# Patient Record
Sex: FEMALE | Race: BLACK OR AFRICAN AMERICAN
[De-identification: names, ages, dates, MRNs, and addresses within clinical notes are randomized per-mention and may not be internally consistent; named-entity substitution may affect disease eponyms.]

---

## 2017-02-24 ENCOUNTER — HOSPITAL ENCOUNTER (EMERGENCY)
Dept: HOSPITAL 74 - JER | Age: 27
LOS: 1 days | Discharge: HOME | End: 2017-02-25
Payer: COMMERCIAL

## 2017-02-24 VITALS — DIASTOLIC BLOOD PRESSURE: 70 MMHG | TEMPERATURE: 98.2 F | HEART RATE: 119 BPM | SYSTOLIC BLOOD PRESSURE: 138 MMHG

## 2017-02-24 VITALS — BODY MASS INDEX: 42.2 KG/M2
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
## 2017-02-24 DIAGNOSIS — S93.492A: Primary | ICD-10-CM
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
## 2017-02-25 NOTE — PDOC
History of Present Illness





- General


History Source: Patient


Exam Limitations: No Limitations





- History of Present Illness


Initial Comments: 


17 00:39





The patient is a 26 year old female (; currently approximately 19 weeks 

pregnant), with no significant past medical history, who presents to the 

emergency department with left ankle pain s/p a mechanical trip and fall down 

the stairs earlier this evening. The patient states that she missed a step and 

fell, landing on her buttocks. She states that she did not hit her head, denies 

head trauma or LOC. The patient denies neck pain, back pain or any numbness/

tingling. The patient denies any other injury. 





Allergies: Penicillins.


Past Surgical History: None reported. 


Social History: Non smoker. Denies alcohol or drug use. 


OBGYN: Dr. Dao 





<Marlene Villa - Last Filed: 17 02:10>





- General


History Source: Patient


Exam Limitations: No Limitations





<Dionisio Mayorga - Last Filed: 17 02:38>





- General


Chief Complaint: Injury


Stated Complaint: FALL/INJURY/25 WKS PREGNANT


Time Seen by Provider: 17 23:52





Past History





<Marlene Villa - Last Filed: 17 02:10>





- Past Medical History


Asthma: No


Cancer: No


Cardiac Disorders: No


Diabetes: No


HTN: No


Seizures: No


Thyroid Disease: No





- Psycho/Social/Smoking Cessation Hx


Anxiety: No


Suicidal Ideation: No


Smoking Status: No


Smoking History: Never smoked


Have you smoked in the past 12 months: No


Number of Cigarettes Smoked Daily: 0


Information on smoking cessation initiated: No


Hx Alcohol Use: No


Drug/Substance Use Hx: No


Substance Use Type: None


Hx Substance Use Treatment: No





<Dionisio Mayorga - Last Filed: 17 02:38>





- Past Medical History


Allergies/Adverse Reactions: 


 Allergies











Allergy/AdvReac Type Severity Reaction Status Date / Time


 


Penicillins Allergy  Hives Verified 17 23:38











Home Medications: 


Ambulatory Orders





Pnv with Ca,No.71/Iron/FA [Prenatal Vitamin Tablet] 1 tab PO DAILY 13 











**Review of Systems





- Review of Systems


Able to Perform ROS?: Yes


Comments:: 


17 00:38





GENERAL/CONSTITUTIONAL: No fever or chills. No weakness.


HEAD, EYES, EARS, NOSE AND THROAT: No change in vision. No ear pain or 

discharge. No sore throat.


CARDIOVASCULAR: No chest pain or shortness of breath.


RESPIRATORY: No cough, wheezing, or hemoptysis.


GASTROINTESTINAL: No nausea, vomiting, diarrhea or constipation.


GENITOURINARY: No dysuria, frequency, or change in urination.


MUSCULOSKELETAL: +Left ankle pain. No muscle swelling or pain. No neck or back 

pain.


SKIN: No rash.


NEUROLOGIC: No headache, vertigo, loss of consciousness, or change in strength/

sensation.


ENDOCRINE: No increased thirst. No abnormal weight change.


HEMATOLOGIC/LYMPHATIC: No anemia, easy bleeding, or history of blood clots.


ALLERGIC/IMMUNOLOGIC: No hives or skin allergy.





<Marlene Villa - Last Filed: 17 02:10>





*Physical Exam





- Vital Signs


 Last Vital Signs











Temp Pulse Resp BP Pulse Ox


 


 98.2 F   119 H  20   138/70   100 


 


 17 23:34  17 23:34  17 23:34  17 23:34  17 23:34














- Physical Exam


Comments: 


17 00:38





GENERAL: Awake, alert, and fully oriented, in no acute distress. 


HEAD: No signs of trauma. 


EYES: PERRLA, EOMI, sclera anicteric, conjunctiva clear. 


ENT: Auricles normal inspection, hearing grossly normal, nares patent, 

oropharynx clear without exudates. Moist mucosa. 


NECK: Normal ROM, supple, no lymphadenopathy, JVD, or masses. 


LUNGS: Breath sounds equal, clear to auscultation bilaterally. No wheezes, and 

no crackles. 


HEART: Regular rate and rhythm, normal S1 and S2, no murmurs, rubs or gallops. 


ABDOMEN: Soft, nontender, normoactive bowel sounds. No guarding, no rebound. No 

masses. 


EXTREMITIES: Swollen left ankle, tenderness to the lateral and medial malleolus

, worse at the medial malleolus. No clubbing or cyanosis. No cords or erythema. 


NEUROLOGICAL: Cranial nerves II through XII intact. Normal speech, gait 

deferred. 


SKIN: Warm, dry, normal turgor, no rashes or lesions noted. 





<Marlene Villa - Last Filed: 17 02:10>





- Vital Signs


 Last Vital Signs











Temp Pulse Resp BP Pulse Ox


 


 98.2 F   119 H  20   138/70   100 


 


 17 23:34  17 23:34  17 23:34  17 23:34  17 23:34














<Dionisio Mayorga - Last Filed: 17 02:38>





Procedures





- Consent


Consent obtained: Verbal, From Patient





- Splinting


Splint Location: Left: Ankle


Pre-Proc Neuro Vasc Exam: normal





<Dionisio Mayorga - Last Filed: 17 02:38>





ED Treatment Course





- Medications


Given in the ED: 


ED Medications














Discontinued Medications














Generic Name Dose Route Start Last Admin





  Trade Name Freq  PRN Reason Stop Dose Admin


 


Acetaminophen  975 mg 17 00:17 17 00:19





  Tylenol -  PO 17 00:18  975 mg





  ONCE ONE   Administration














<Marlene Villa - Last Filed: 17 02:10>





- RADIOLOGY


Radiology Studies Ordered: 














 Category Date Time Status


 


 ANKLE-LEFT [RAD] Stat Radiology  17 00:17 Ordered














- Medications


Given in the ED: 


ED Medications














Discontinued Medications














Generic Name Dose Route Start Last Admin





  Trade Name Freq  PRN Reason Stop Dose Admin


 


Acetaminophen  975 mg 17 00:17 17 00:19





  Tylenol -  PO 17 00:18  975 mg





  ONCE ONE   Administration














<Dioniiso Mayorga - Last Filed: 17 02:38>





Medical Decision Making





- Medical Decision Making


17 00:29





A portion of this note was documented by scribe services under my direction. I 

have reviewed the details of the note, within reason, and agree with the 

documentation with the following case summary and management plan written by 

me. 





Patient treated in the ED.





Nursing notes are reviewed and incorporated into the medical decision-making.


Vital signs reviewed.





Peripheral IV access obtained by the nurse, laboratory studies are drawn and 

sent, reviewed and interpreted by myself. 





 Vital Signs











Temp Pulse Resp BP Pulse Ox


 


 98.2 F   119 H  20   138/70   100 


 


 17 23:34  17 23:34  17 23:34  17 23:34  02/24/17 23:34








26-year-old female with no past medical history  approximately 19 weeks 

pregnant, followed by Dr. Dao presents with mechanical fall. Patient twisted 

her ankle and landed on her buttocks. Denies abdominal pain or vaginal 

bleeding. Denies head trauma. Patient reports left ankle swelling and pain 

acutely worse at the medial malleolus.





Patient fails [place] ankle rule. I discussed the risks and benefits of 

radiograph while pregnant with the patient. Given the minimal exposure of x-ray 

and abdominal coverage with lead, the patient agrees for ankle x-ray. We'll 

perform an ankle x-ray. 





17 02:13





Xray reviewed by me, pending official radiology read. No acute fractures.


however, I suspect that the patient has a high degree sprain.


Patient placed in a posterior U splint and made non weight bearing.


Elevate the leg.


Ice PRN.


Follow up with orthopedics.


Pt will go home with her .


Tylenol only for pain.


Crutches given.





I had called the L&D. Given less than 20 weeks, patient can be discharged from 

the ED and does not necessitate FHR monitoring.





Pt has NO abdominal pain or vaginal bleed. She is cleared to be discharged.


patient verbalizes understanding and agrees with plan.





I discussed the physical exam findings, ancillary test results and final 

diagnoses with the patient.  I answered all of the patient's questions.  The 

patient was satisfied with the care received and felt comfortable with the 

discharge plan and treatment plan.  The patient will call their primary care 

physician within 24 hours to arrange follow-up and will return to the Emergency 

Department with any new, persistant or worsening symptoms. 








17 02:37


After discussing with [facility], they had reviewed the chart and confirmed 

that the patient is less than 20 weeks pregnant. I had confirmed with the 

patient and she reports that she is less than 20 weeks pregnant.





<Dionisio Mayorga - Last Filed: 17 02:38>





*DC/Admit/Observation/Transfer





- Attestations


Scribe Attestion: 


17 00:38





Documentation prepared by Marlene Villa, acting as medical scribe for Dionisio Mayorga MD. 





<Marlene Villa - Last Filed: 17 02:10>





- Discharge Dispostion


Admit: No





<Dionisio Mayorga - Last Filed: 17 02:38>


Diagnosis at time of Disposition: 


High ankle sprain


Qualifiers:


 Encounter type: initial encounter Laterality: left Qualified Code(s): S93.432A 

- Sprain of tibiofibular ligament of left ankle, initial encounter





- Discharge Dispostion


Disposition: HOME


Condition at time of disposition: Improved





- Referrals


Referrals: 


Paul Bustos MD [Staff Physician] - 





- Patient Instructions


Printed Discharge Instructions:  DI for Ankle Sprain


Additional Instructions: 


Elevate the leg as much as you can.


650 mg tylenol every 4 hours as needed for pain.


Do not bear weight on the ankle.


Use crutches.


Follow up with your ob/gyn.


Call and schedule an appointment with an orthopedist, Dr. Bustos.


Ice as needed.





- Post Discharge Activity


Work/School Note:  Back to Work

## 2017-07-25 ENCOUNTER — HOSPITAL ENCOUNTER (INPATIENT)
Dept: HOSPITAL 74 - JLDR | Age: 27
LOS: 3 days | Discharge: HOME | DRG: 540 | End: 2017-07-28
Attending: OBSTETRICS & GYNECOLOGY | Admitting: OBSTETRICS & GYNECOLOGY
Payer: COMMERCIAL

## 2017-07-25 VITALS — BODY MASS INDEX: 43 KG/M2

## 2017-07-25 DIAGNOSIS — O34.211: ICD-10-CM

## 2017-07-25 DIAGNOSIS — Z3A.40: ICD-10-CM

## 2017-07-25 DIAGNOSIS — O48.0: Primary | ICD-10-CM

## 2017-07-25 RX ADMIN — ACETAMINOPHEN PRN MG: 325 TABLET ORAL at 17:58

## 2017-07-25 RX ADMIN — CLINDAMYCIN PHOSPHATE SCH MLS/HR: 600 INJECTION, SOLUTION INTRAVENOUS at 17:59

## 2017-07-25 RX ADMIN — SODIUM CHLORIDE, SODIUM GLUCONATE, SODIUM ACETATE, POTASSIUM CHLORIDE, AND MAGNESIUM CHLORIDE SCH MLS/HR: 526; 502; 368; 37; 30 INJECTION, SOLUTION INTRAVENOUS at 08:50

## 2017-07-25 NOTE — HP
Past Medical History





- Primary Care Physician


PCP:: Nikhil Kearns





- Admission


Chief Complaint: pregnancy 40.4 weeks, previous c/s, request of repeat c/s


History Source: Patient


Limitations to Obtaining History: No Limitations





- Past Surgical History


Past Surgical History: Yes: 


Hx Myomectomy: No


Hx Transabdominal Cerclage: No





- Smoking History


Smoking history: Never smoked


Have you smoked in the past 12 months: No


Aproximately how many cigarettes per day: 0





- Alcohol/Substance Use


Hx Alcohol Use: No





- Social History


Usual Living Arrangement: Yes: With Spouse


History of Recent Travel: No





Home Medications





- Allergies


Allergies/Adverse Reactions: 


 Allergies











Allergy/AdvReac Type Severity Reaction Status Date / Time


 


Penicillins Allergy  Hives Verified 17 23:38














- Home Medications


Home Medications: 


Ambulatory Orders





Pnv with Ca,No.71/Iron/FA [Prenatal Vitamin Tablet] 1 tab PO DAILY 13 


Iron 1 tab PO BID 17 











Physical Exam - Maternity


Constitutional: Yes: Well Nourished, No Distress, Calm


Eyes: Yes: WNL, Conjunctiva Clear, EOM Intact


HENT: Yes: WNL, Atraumatic, Normocephalic


Neck: Yes: WNL, Supple, Trachea Midline


Cardiovascular: Yes: WNL, Regular Rate and Rhythm


Breast(s): Yes: WNL





- Abdominal Exam/OB


Fundal Height: 40


Number of Fetuses: Single


Fetal Presentation: Vertex


Regularity: Irregular


Intensity: Unaware


Fetal Monitor Mode: External


Fetal Heart Rate Location: [facility]


Category: I


Accelerations: Uniform


Decelerations: None





- Vaginal Exam/OB


Vaginal Bleediing: No


Dilatation (cm): closed


Effacement (%): 0


Amniotic Membrane Status: Intact


Nitrazine Test: Negative


Fetal Presentation: Vertex/Position


Fetal Station: -3





- Physical Exam


Edema: Yes


Edema: LLE: Trace, RLE: Trace


...Motor Strength: WNL


Psychiatric: Yes: WNL





Hemorrhage Risk Assessment





- Risk Factors


Risk Score: 1


Risk Level: Medium Risk





Problem List





- Problems


(1) Post term pregnancy over 40 weeks


Code(s): O48.0 - POST-TERM PREGNANCY





(2) Previous  section complicating pregnancy


Code(s): O34.219 - MATERNAL CARE FOR UNSP TYPE SCAR FROM PREVIOUS  DEL








Assessment/Plan


repeat c/s, rba discussed

## 2017-07-25 NOTE — OP
DATE OF OPERATION:  2017

 

PREOPERATIVE DIAGNOSIS:  Pregnancy, 40.4 weeks gestation; previous  section;

requests a repeat section.

 

POSTOPERATIVE DIAGNOSIS:  Pregnancy, 40.4 weeks gestation; previous  section;

requests a repeat section.

 

PROCEDURE:  Repeat low-segment transverse  section.

 

SURGEON:  Karen Maynard MD

 

FIRST ASSIST:  REKHA Harmon

 

ANESTHESIA:  Spinal.

 

ANESTHESIOLOGIST:  Dr. Broussard

 

ESTIMATED BLOOD LOSS:  500 mL

 

FINDINGS:  A live baby boy, occiput posterior position, Apgars 9 and 9.

 

OPERATING COURSE:  Patient was taken to the operating room.  Under adequate spinal

anesthesia, abdomen and perineum were prepped and draped.  Pfannenstiel abdominal

skin incision was made over the previous incision.  Abdominal wall was cut layer by

layer until peritoneum was exposed and incised.  Upon entering the abdominal cavity,

there were some upper abdominal, pelvic adhesions, and the bladder was adherent to

the lower uterine segment.  These adhesions were lysed with Metzenbaum scissors, and

the bladder was released and pushed down.  Then, a low transverse incision was made. 

Incision extended laterally.  Amniotic sac was entered; clear fluid.  Head delivered

from occiput posterior position.  Nasopharynx was suctioned.  Live baby boy was

delivered without any difficulty.  Placenta was delivered manually.  Uterine cavity

was cleaned of all remaining tissue.  Uterine incision was closed in 2 layers, first

layer with 0 Biosyn continuous suture, the second layer with 0 Biosyn imbricating the

first layer.  Bladder flap was closed with 0 Biosyn continuous suture.  Both tubes

and ovaries were checked, were normal.  No active bleeding was seen.  All the lap

pads, sponge, and instrument counts were correct.  Peritoneum was closed with 0

Biosyn continuous suture.  Muscles were brought together with interrupted sutures of

0 Biosyn.  Fascia was closed with 0 Biosyn continuous suture, subcutaneous fat with

interrupted suture of 3-0 Vicryl, and the skin was closed with subcuticular _____

suture.  Patient tolerated the procedure well, left the OR in good condition.

 

 

KAREN MAYNARD M.D.

 

/[id number]

DD: 2017 12:45

DT: 2017 17:48

Job #:  [id number]

## 2017-07-25 NOTE — SURG
Surgery First Assist Note


First Assist: Everton Hernandez PA-C


Date of Service: 17


Diagnosis: 


40.4 weeks pregnant, previous  section


Procedure: 


 section (boy)


I was present for the entirety of the operative procedure. For further detail, 

please refer to operative report.








Visit type





- Case Type


Case Type: Scheduled Admission





- New patient


This patient is new to me today: Yes


Date on this admission: 17

## 2017-07-26 LAB
BASOPHILS # BLD: 0.2 % (ref 0–2)
DEPRECATED RDW RBC AUTO: 14 % (ref 11.6–15.6)
EOSINOPHIL # BLD: 1.1 % (ref 0–4.5)
MCH RBC QN AUTO: 27 PG (ref 25.7–33.7)
MCHC RBC AUTO-ENTMCNC: 33.2 G/DL (ref 32–36)
MCV RBC: 81.4 FL (ref 80–96)
NEUTROPHILS # BLD: 77.6 % (ref 42.8–82.8)
PLATELET # BLD AUTO: 138 K/MM3 (ref 134–434)
PMV BLD: 10.4 FL (ref 7.5–11.1)
WBC # BLD AUTO: 11.6 K/MM3 (ref 4–10)

## 2017-07-26 RX ADMIN — ACETAMINOPHEN PRN MG: 325 TABLET ORAL at 14:26

## 2017-07-26 RX ADMIN — ACETAMINOPHEN PRN MG: 325 TABLET ORAL at 20:26

## 2017-07-26 RX ADMIN — ACETAMINOPHEN PRN MG: 325 TABLET ORAL at 07:15

## 2017-07-26 RX ADMIN — SODIUM CHLORIDE, SODIUM LACTATE, POTASSIUM CHLORIDE, CALCIUM CHLORIDE AND DEXTROSE MONOHYDRATE SCH: 5; 600; 310; 30; 20 INJECTION, SOLUTION INTRAVENOUS at 16:04

## 2017-07-26 RX ADMIN — IBUPROFEN PRN MG: 600 TABLET, FILM COATED ORAL at 20:25

## 2017-07-26 RX ADMIN — DOCUSATE SODIUM,SENNOSIDES PRN TABLET: 50; 8.6 TABLET, FILM COATED ORAL at 20:25

## 2017-07-26 RX ADMIN — SODIUM CHLORIDE, SODIUM GLUCONATE, SODIUM ACETATE, POTASSIUM CHLORIDE, AND MAGNESIUM CHLORIDE SCH: 526; 502; 368; 37; 30 INJECTION, SOLUTION INTRAVENOUS at 10:04

## 2017-07-26 RX ADMIN — IBUPROFEN PRN MG: 600 TABLET, FILM COATED ORAL at 07:14

## 2017-07-26 RX ADMIN — CLINDAMYCIN PHOSPHATE SCH MLS/HR: 600 INJECTION, SOLUTION INTRAVENOUS at 09:12

## 2017-07-26 RX ADMIN — SIMETHICONE CHEW TAB 80 MG PRN MG: 80 TABLET ORAL at 20:25

## 2017-07-26 RX ADMIN — SIMETHICONE CHEW TAB 80 MG PRN MG: 80 TABLET ORAL at 14:26

## 2017-07-26 RX ADMIN — SIMETHICONE CHEW TAB 80 MG PRN MG: 80 TABLET ORAL at 07:14

## 2017-07-26 RX ADMIN — CLINDAMYCIN PHOSPHATE SCH MLS/HR: 600 INJECTION, SOLUTION INTRAVENOUS at 02:07

## 2017-07-26 NOTE — PN
Progress Note (short form)





- Note


Progress Note: 


pod 1 doing well, ambulating


 CBC, BMP





 17 06:10 





 Last Vital Signs











Temp Pulse Resp BP Pulse Ox


 


 98.3 F   91 H  20   114/66    


 


 17 10:00  17 10:00  17 10:00  17 10:00   








abdomen soft, no distension, no cva


incision dry, clean 


no calf tenderness


plan ambulate, advance diet


pain management 





Problem List





- Problems


(1) Post term pregnancy over 40 weeks


Code(s): O48.0 - POST-TERM PREGNANCY





(2) Previous  section complicating pregnancy


Code(s): O34.219 - MATERNAL CARE FOR UNSP TYPE SCAR FROM PREVIOUS  DEL

## 2017-07-26 NOTE — PN
Progress Note (short form)





- Note


Progress Note: 


Anesthesia postop note





26 y/o F s/p spinal anesthesia for  section, duramorph for postop pain 

management


POD#1, vss, aaox3, pain well controlled, sensorymotor intact distally


No anesthesia complications.

## 2017-07-27 RX ADMIN — SIMETHICONE CHEW TAB 80 MG PRN MG: 80 TABLET ORAL at 20:14

## 2017-07-27 RX ADMIN — SODIUM CHLORIDE, SODIUM GLUCONATE, SODIUM ACETATE, POTASSIUM CHLORIDE, AND MAGNESIUM CHLORIDE SCH: 526; 502; 368; 37; 30 INJECTION, SOLUTION INTRAVENOUS at 16:09

## 2017-07-27 RX ADMIN — SIMETHICONE CHEW TAB 80 MG PRN MG: 80 TABLET ORAL at 14:41

## 2017-07-27 RX ADMIN — DOCUSATE SODIUM,SENNOSIDES PRN TABLET: 50; 8.6 TABLET, FILM COATED ORAL at 20:17

## 2017-07-27 RX ADMIN — IBUPROFEN PRN MG: 600 TABLET, FILM COATED ORAL at 14:41

## 2017-07-27 RX ADMIN — ACETAMINOPHEN PRN MG: 325 TABLET ORAL at 14:43

## 2017-07-27 RX ADMIN — ACETAMINOPHEN PRN MG: 325 TABLET ORAL at 08:00

## 2017-07-27 RX ADMIN — IBUPROFEN PRN MG: 600 TABLET, FILM COATED ORAL at 07:59

## 2017-07-27 RX ADMIN — IBUPROFEN PRN MG: 600 TABLET, FILM COATED ORAL at 20:16

## 2017-07-27 RX ADMIN — SODIUM CHLORIDE, SODIUM LACTATE, POTASSIUM CHLORIDE, CALCIUM CHLORIDE AND DEXTROSE MONOHYDRATE SCH: 5; 600; 310; 30; 20 INJECTION, SOLUTION INTRAVENOUS at 16:08

## 2017-07-27 RX ADMIN — SIMETHICONE CHEW TAB 80 MG PRN MG: 80 TABLET ORAL at 07:58

## 2017-07-27 NOTE — PN
Post Partum Progress Note





- Subjective


Subjective: 


28 yo Para 2, status post repeat , seen and evaluated.


She's breast feeding; she c/o mild incision pain.


Post Partum Day: 2


Type of Delivery: Repeat C/S


Vital Signs: 


 Vital Signs











Temperature  99.6 F   17 20:28


 


Pulse Rate  95 H  17 20:28


 


Respiratory Rate  20   17 20:28


 


Blood Pressure  130/76   17 20:28


 


O2 Sat by Pulse Oximetry (%)      











Breast Exam: Yes: Soft


Uterus: Yes: Fundus Firm


Incision: Yes: Dressing dry and intact


Abdomen/GI: Yes: Abdomen soft, Tolerating PO


Lochia: Yes: Rubra


Lochia, amount: Small


Extremities: Yes: Calves non-tender


Perineum: Yes: Intact


Activity: Ambulating





- Labs


Labs: 


 CBC











WBC  11.6 K/mm3 (4.0-10.0)  H  17  06:10    


 


RBC  4.31 M/mm3 (3.60-5.2)   17  06:10    


 


Hgb  11.7 GM/dL (10.7-15.3)   17  06:10    


 


Hct  35.1 % (32.4-45.2)   17  06:10    


 


MCV  81.4 fl (80-96)   17  06:10    


 


MCH  27.0 pg (25.7-33.7)   17  06:10    


 


MCHC  33.2 g/dl (32.0-36.0)   17  06:10    


 


RDW  14.0 % (11.6-15.6)   17  06:10    


 


Plt Count  138 K/MM3 (134-434)   17  06:10    


 


MPV  10.4 fl (7.5-11.1)   17  06:10    


 


Neutrophils %  77.6 % (42.8-82.8)   17  06:10    


 


Lymphocytes %  14.3 % (8-40)  D 17  06:10    


 


Monocytes %  6.8 % (3.8-10.2)   17  06:10    


 


Eosinophils %  1.1 % (0-4.5)   17  06:10    


 


Basophils %  0.2 % (0-2.0)   17  06:10    














Assessment/Plan


Status post repeat 


Stable


Continue routine Post op care

## 2017-07-27 NOTE — PATH
Surgical Pathology Report



Patient Name:  SHANIA STARKS

Accession #:  [id number]

Med. Rec. #:  [id number]                                                        

   /Age/Gender:  1990 (Age: 27) / F

Account:  [id number]                                                          

             Location: [facility] OBS/GYN

Taken:  2017

Received:  2017

Reported:  2017

Physicians:  Nikhil Kearns M.D.

  



Specimen(s) Received

 PLACENTA 





Clinical History

, 40.4 GW; C/section 3/13

Scheduled repeat c/section







Final Diagnosis

PLACENTA, DELIVERY:

INTACT THIRD TRIMESTER PLACENTA WITH FOCAL CHRONIC DECIDUITIS, MILD PREVILLOUS,

PERIVILLOUS, AND PRECHORIONIC FIBRIN DEPOSITION, THREE VESSEL UMBILICAL CORD,

AND PLACENTAL MEMBRANES WITH FOCAL AMNION HYPERPLASIA.





***Electronically Signed***

Alexander Finkelstein, M.D.





Gross Description

The specimen is received fresh, labeled "placenta" and is 605 gram, 18.0 x 17.0

x 2.9 cm placenta with attached membranes and umbilical cord.  The attached

membranes are tan, focally thickened and focally opaque and insert marginally. 

The umbilical cord measures 38 cm in length and averages 0.9 cm in diameter. 

The cord inserts eccentrically, 5 cm to the nearest margin.  No true knots or

strictures are identified. Cut surface of the umbilical cord reveals 3 vessels.

The fetal surface is grey-blue with minimal fibrin deposition and appropriate

caliber vessels. The maternal surface is red-brown and intact. Sectioning

reveals red-brown, spongy parenchyma.  No focal lesions are identified. 

Representative sections are submitted in three cassettes as follows: 1- membrane

rolls and umbilical cord; 2-3- full thickness sections of placenta. 

AF/2017



final/2017

## 2017-07-28 VITALS — TEMPERATURE: 98.1 F | HEART RATE: 91 BPM | SYSTOLIC BLOOD PRESSURE: 135 MMHG | DIASTOLIC BLOOD PRESSURE: 83 MMHG

## 2017-07-28 LAB
BASOPHILS # BLD: 0.3 % (ref 0–2)
DEPRECATED RDW RBC AUTO: 14.2 % (ref 11.6–15.6)
EOSINOPHIL # BLD: 3.2 % (ref 0–4.5)
MCH RBC QN AUTO: 26.7 PG (ref 25.7–33.7)
MCHC RBC AUTO-ENTMCNC: 32.6 G/DL (ref 32–36)
MCV RBC: 82.1 FL (ref 80–96)
NEUTROPHILS # BLD: 77.1 % (ref 42.8–82.8)
PLATELET # BLD AUTO: 165 K/MM3 (ref 134–434)
PMV BLD: 10.6 FL (ref 7.5–11.1)
WBC # BLD AUTO: 11.6 K/MM3 (ref 4–10)

## 2017-07-28 RX ADMIN — SIMETHICONE CHEW TAB 80 MG PRN MG: 80 TABLET ORAL at 09:09

## 2017-07-28 RX ADMIN — SIMETHICONE CHEW TAB 80 MG PRN MG: 80 TABLET ORAL at 04:46

## 2017-07-28 RX ADMIN — ACETAMINOPHEN PRN MG: 325 TABLET ORAL at 04:45

## 2017-07-28 RX ADMIN — IBUPROFEN PRN MG: 600 TABLET, FILM COATED ORAL at 09:08

## 2017-07-28 NOTE — DS
Physical Exam-GYN


Vital Signs: 


 Vital Signs











Temperature  98.3 F   17 22:00


 


Pulse Rate  93 H  17 22:00


 


Respiratory Rate  20   17 22:00


 


Blood Pressure  130/87   17 22:00


 


O2 Sat by Pulse Oximetry (%)      











Constitutional: Yes: Well Nourished, No Distress, Calm


Eyes: Yes: WNL, Conjunctiva Clear, EOM Intact


HENT: Yes: WNL, Atraumatic, Normocephalic


Neck: Yes: WNL, Supple, Trachea Midline


Cardiovascular: Yes: WNL, Regular Rate and Rhythm


Respiratory: Yes: WNL, Regular, CTA Bilaterally


Gastrointestinal: Yes: WNL


...Rectal Exam: Yes: WNL


Renal/: Yes: WNL


....Post Partum: Yes: Uterus firm, Uterus non-tender, Slight lochia serosa


Breast(s): Yes: WNL


Musculoskeletal: Yes: WNL


Extremities: Yes: WNL


Edema: Yes


Edema: LLE: Trace, RLE: Trace


Integumentary: Yes: WNL


Wound/Incision: Yes: Clean/Dry, Well Approximated, Sutures Intact


Neurological: Yes: WNL, Alert, Oriented


...Motor Strength: WNL


Psychiatric: Yes: WNL, Alert, Oriented


Labs: 


 CBC, BMP





 17 06:10 











Delivery





- Delivery


 Section: Repeat, Low Flap Transverse (no complication)


Type of Anesthesia: Spinal


Episiotomy/Laceration: None


EBL (cc): 500





Delivery, Single Birth





- Stages of Labor


Date 1st Stage Initiatied: 17


Time 1st Stage Initiated: 06:00


Date of Delivery: 17


Time of Delivery: 11:17


Time Placenta Delivered: 11:19


Placenta: Yes: Expressed





- Condition of Infant


Pediatrician/Neonatologist Present: Yes


Name: Adiel Bartholomew


Infant Gender: Male


Birth Weight: 8 lb 8 oz


Position: Right, OT


Total Hours ROM (Hrs/Mins): 1min





- Apgar


  ** 1 Minute


Apgar Total Score: 9





  ** 5 Minutes


Apgar Total Score: 9





- [place] Feeding Plan


Initial Plan: Exclusive breastfeeding throughout hospitalization





Discharge Summary


Reason For Visit: CSECTION


Current Active Problems





Post term pregnancy over 40 weeks (Acute) 


Previous  section complicating pregnancy (Acute) 








Procedures: Principal: repeat LST c/s


Hospital Course: 


uncomplicated


Condition: Good





- Instructions


Diet, Activity, Other Instructions: 


regular diet, , no intercourse. follow up [facility] care 1 week


Referrals: 


Nikhil Kearns MD [Staff Physician] - 


Disposition: HOME





- Home Medications


Comprehensive Discharge Medication List: 


Ambulatory Orders





Pnv with Ca,No.71/Iron/FA [Prenatal Vitamin Tablet] 1 tab PO DAILY 13 


Iron 1 tab PO BID 17 


Ibuprofen [Motrin -] 600 mg PO QID #28 tablet 17 


Oxycodone HCl/Acetaminophen [Percocet 5-325 mg Tablet] 1 tab PO Q6H #20 tablet 

MDD 4 17

## 2021-12-22 ENCOUNTER — HOSPITAL ENCOUNTER (EMERGENCY)
Dept: HOSPITAL 74 - JER | Age: 31
End: 2021-12-22
Payer: COMMERCIAL

## 2021-12-22 VITALS — TEMPERATURE: 98.7 F | HEART RATE: 117 BPM | DIASTOLIC BLOOD PRESSURE: 73 MMHG | SYSTOLIC BLOOD PRESSURE: 106 MMHG

## 2021-12-22 VITALS — BODY MASS INDEX: 31.2 KG/M2

## 2021-12-22 DIAGNOSIS — U07.1: Primary | ICD-10-CM

## 2021-12-22 DIAGNOSIS — R05.1: ICD-10-CM

## 2021-12-22 DIAGNOSIS — J02.9: ICD-10-CM

## 2021-12-22 PROCEDURE — U0003 INFECTIOUS AGENT DETECTION BY NUCLEIC ACID (DNA OR RNA); SEVERE ACUTE RESPIRATORY SYNDROME CORONAVIRUS 2 (SARS-COV-2) (CORONAVIRUS DISEASE [COVID-19]), AMPLIFIED PROBE TECHNIQUE, MAKING USE OF HIGH THROUGHPUT TECHNOLOGIES AS DESCRIBED BY CMS-2020-01-R: HCPCS

## 2021-12-22 PROCEDURE — U0005 INFEC AGEN DETEC AMPLI PROBE: HCPCS

## 2021-12-22 PROCEDURE — C9803 HOPD COVID-19 SPEC COLLECT: HCPCS

## 2022-08-15 ENCOUNTER — HOSPITAL ENCOUNTER (EMERGENCY)
Dept: HOSPITAL 74 - JERFT | Age: 32
Discharge: HOME | End: 2022-08-15
Payer: COMMERCIAL

## 2022-08-15 VITALS
HEART RATE: 82 BPM | RESPIRATION RATE: 17 BRPM | DIASTOLIC BLOOD PRESSURE: 74 MMHG | SYSTOLIC BLOOD PRESSURE: 111 MMHG | TEMPERATURE: 97.9 F

## 2022-08-15 VITALS — BODY MASS INDEX: 26.6 KG/M2

## 2022-08-15 DIAGNOSIS — T78.40XA: Primary | ICD-10-CM
